# Patient Record
Sex: FEMALE | URBAN - NONMETROPOLITAN AREA
[De-identification: names, ages, dates, MRNs, and addresses within clinical notes are randomized per-mention and may not be internally consistent; named-entity substitution may affect disease eponyms.]

---

## 2023-02-08 ENCOUNTER — APPOINTMENT (OUTPATIENT)
Dept: URBAN - NONMETROPOLITAN AREA CLINIC 39 | Age: 53
Setting detail: DERMATOLOGY
End: 2023-02-08

## 2023-02-08 DIAGNOSIS — R21 RASH AND OTHER NONSPECIFIC SKIN ERUPTION: ICD-10-CM

## 2023-02-08 PROCEDURE — OTHER COUNSELING: OTHER

## 2023-02-08 PROCEDURE — OTHER MIPS QUALITY: OTHER

## 2023-02-08 PROCEDURE — OTHER PRESCRIPTION: OTHER

## 2023-02-08 PROCEDURE — OTHER BIOPSY BY PUNCH METHOD: OTHER

## 2023-02-08 PROCEDURE — 11104 PUNCH BX SKIN SINGLE LESION: CPT

## 2023-02-08 RX ORDER — TRIAMCINOLONE ACETONIDE 1 MG/G
CREAM TOPICAL BID
Qty: 80 | Refills: 0 | Status: ERX | COMMUNITY
Start: 2023-02-08

## 2023-02-08 ASSESSMENT — LOCATION ZONE DERM: LOCATION ZONE: ARM

## 2023-02-08 ASSESSMENT — LOCATION SIMPLE DESCRIPTION DERM: LOCATION SIMPLE: RIGHT FOREARM

## 2023-02-08 ASSESSMENT — LOCATION DETAILED DESCRIPTION DERM: LOCATION DETAILED: RIGHT PROXIMAL RADIAL DORSAL FOREARM

## 2023-02-08 NOTE — PROCEDURE: BIOPSY BY PUNCH METHOD
Path Notes Override (Will Replace All Of The Above Text): On Entyvio and has been for > 1 year. Rash began 1 day after infusion in right hand

## 2023-02-08 NOTE — HPI: RASH
How Severe Is Your Rash?: mild
Is This A New Presentation, Or A Follow-Up?: Referral for Rash
Additional History: Had Entyvio infusion in right hand 3 weeks ago and then developed rash on right forearm the next day. Since then, she states the rash has been spreading up forearm. She has been on Entyvio for 1.5-2 years but denies any similar rashes or issues after infusions. Due for next infusion in early March.
Who Is Your Referring Provider?: Dr. Win